# Patient Record
Sex: FEMALE | Race: WHITE | NOT HISPANIC OR LATINO | Employment: FULL TIME | URBAN - METROPOLITAN AREA
[De-identification: names, ages, dates, MRNs, and addresses within clinical notes are randomized per-mention and may not be internally consistent; named-entity substitution may affect disease eponyms.]

---

## 2018-06-19 ENCOUNTER — HISTORICAL (OUTPATIENT)
Dept: ADMINISTRATIVE | Facility: HOSPITAL | Age: 58
End: 2018-06-19

## 2018-06-19 LAB
ABS NEUT (OLG): 4
ALBUMIN SERPL-MCNC: 4.2 GM/DL (ref 3.4–5)
ALBUMIN/GLOB SERPL: 1.75 {RATIO} (ref 1.5–2.5)
ALP SERPL-CCNC: 72 UNIT/L (ref 38–126)
ALT SERPL-CCNC: 6 UNIT/L (ref 7–52)
AST SERPL-CCNC: 14 UNIT/L (ref 15–37)
BILIRUB SERPL-MCNC: 0.3 MG/DL (ref 0.2–1)
BILIRUBIN DIRECT+TOT PNL SERPL-MCNC: 0.1 MG/DL (ref 0–0.5)
BILIRUBIN DIRECT+TOT PNL SERPL-MCNC: 0.2 MG/DL
BUN SERPL-MCNC: 12 MG/DL (ref 7–18)
CALCIUM SERPL-MCNC: 8.9 MG/DL (ref 8.5–10)
CHLORIDE SERPL-SCNC: 102 MMOL/L (ref 98–107)
CHOLEST SERPL-MCNC: 173 MG/DL (ref 0–200)
CHOLEST/HDLC SERPL: 3.7 {RATIO}
CO2 SERPL-SCNC: 30 MMOL/L (ref 21–32)
CREAT SERPL-MCNC: 0.81 MG/DL (ref 0.6–1.3)
ERYTHROCYTE [DISTWIDTH] IN BLOOD BY AUTOMATED COUNT: 14.2 % (ref 11.5–17)
GLOBULIN SER-MCNC: 2.4 GM/DL (ref 1.2–3)
GLUCOSE SERPL-MCNC: 93 MG/DL (ref 74–106)
HCT VFR BLD AUTO: 44.1 % (ref 37–47)
HDLC SERPL-MCNC: 47 MG/DL (ref 35–60)
HGB BLD-MCNC: 15.2 GM/DL (ref 12–16)
LDLC SERPL CALC-MCNC: 112 MG/DL (ref 0–129)
LYMPHOCYTES # BLD AUTO: 3.8 X10(3)/MCL (ref 0.6–3.4)
LYMPHOCYTES NFR BLD AUTO: 44.3 % (ref 13–40)
MCH RBC QN AUTO: 32.4 PG (ref 27–31.2)
MCHC RBC AUTO-ENTMCNC: 34 GM/DL (ref 32–36)
MCV RBC AUTO: 94 FL (ref 80–94)
MONOCYTES # BLD AUTO: 0.7 X10(3)/MCL (ref 0–1.8)
MONOCYTES NFR BLD AUTO: 8.4 % (ref 0.1–24)
NEUTROPHILS NFR BLD AUTO: 47.3 % (ref 47–80)
PLATELET # BLD AUTO: 206 X10(3)/MCL (ref 130–400)
PMV BLD AUTO: 9.2 FL
POTASSIUM SERPL-SCNC: 3.7 MMOL/L (ref 3.5–5.1)
PROT SERPL-MCNC: 6.6 GM/DL (ref 6.4–8.2)
RBC # BLD AUTO: 4.69 X10(6)/MCL (ref 4.2–5.4)
SODIUM SERPL-SCNC: 137 MMOL/L (ref 136–145)
TRIGL SERPL-MCNC: 51 MG/DL (ref 30–150)
VLDLC SERPL CALC-MCNC: 10.2 MG/DL
WBC # SPEC AUTO: 8.5 X10(3)/MCL (ref 4.5–11.5)

## 2018-08-21 ENCOUNTER — HISTORICAL (OUTPATIENT)
Dept: ADMINISTRATIVE | Facility: HOSPITAL | Age: 58
End: 2018-08-21

## 2018-08-21 LAB
HEMOCCULT SP1 STL QL: NEGATIVE
HEMOCCULT SP2 STL QL: NEGATIVE

## 2019-06-24 ENCOUNTER — HISTORICAL (OUTPATIENT)
Dept: ADMINISTRATIVE | Facility: HOSPITAL | Age: 59
End: 2019-06-24

## 2019-06-24 LAB
ABS NEUT (OLG): 5.6 X10(3)/MCL (ref 2.1–9.2)
ALBUMIN SERPL-MCNC: 4.4 GM/DL (ref 3.4–5)
ALBUMIN/GLOB SERPL: 2.32 {RATIO} (ref 1.5–2.5)
ALP SERPL-CCNC: 66 UNIT/L (ref 38–126)
ALT SERPL-CCNC: 6 UNIT/L (ref 7–52)
AST SERPL-CCNC: 14 UNIT/L (ref 15–37)
BILIRUB SERPL-MCNC: 0.4 MG/DL (ref 0.2–1)
BILIRUBIN DIRECT+TOT PNL SERPL-MCNC: 0.1 MG/DL (ref 0–0.5)
BILIRUBIN DIRECT+TOT PNL SERPL-MCNC: 0.3 MG/DL
BUN SERPL-MCNC: 11 MG/DL (ref 7–18)
CALCIUM SERPL-MCNC: 9 MG/DL (ref 8.5–10)
CHLORIDE SERPL-SCNC: 104 MMOL/L (ref 98–107)
CHOLEST SERPL-MCNC: 180 MG/DL (ref 0–200)
CHOLEST/HDLC SERPL: 4 {RATIO}
CO2 SERPL-SCNC: 29 MMOL/L (ref 21–32)
CREAT SERPL-MCNC: 0.65 MG/DL (ref 0.6–1.3)
ERYTHROCYTE [DISTWIDTH] IN BLOOD BY AUTOMATED COUNT: 14.6 % (ref 11.5–17)
GLOBULIN SER-MCNC: 1.9 GM/DL (ref 1.2–3)
GLUCOSE SERPL-MCNC: 91 MG/DL (ref 74–106)
HCT VFR BLD AUTO: 43.8 % (ref 37–47)
HDLC SERPL-MCNC: 45 MG/DL (ref 35–60)
HGB BLD-MCNC: 14.8 GM/DL (ref 12–16)
LDLC SERPL CALC-MCNC: 111 MG/DL (ref 0–129)
LYMPHOCYTES # BLD AUTO: 4.6 X10(3)/MCL (ref 0.6–3.4)
LYMPHOCYTES NFR BLD AUTO: 42.2 % (ref 13–40)
MCH RBC QN AUTO: 31.4 PG (ref 27–31.2)
MCHC RBC AUTO-ENTMCNC: 34 GM/DL (ref 32–36)
MCV RBC AUTO: 93 FL (ref 80–94)
MONOCYTES # BLD AUTO: 0.7 X10(3)/MCL (ref 0.1–1.3)
MONOCYTES NFR BLD AUTO: 6.2 % (ref 0.1–24)
NEUTROPHILS NFR BLD AUTO: 51.6 % (ref 47–80)
PLATELET # BLD AUTO: 225 X10(3)/MCL (ref 130–400)
PMV BLD AUTO: 9.4 FL (ref 9.4–12.4)
POTASSIUM SERPL-SCNC: 4.6 MMOL/L (ref 3.5–5.1)
PROT SERPL-MCNC: 6.3 GM/DL (ref 6.4–8.2)
RBC # BLD AUTO: 4.72 X10(6)/MCL (ref 4.2–5.4)
SODIUM SERPL-SCNC: 138 MMOL/L (ref 136–145)
TRIGL SERPL-MCNC: 55 MG/DL (ref 30–150)
TSH SERPL-ACNC: 1.47 MIU/ML (ref 0.35–4.94)
VLDLC SERPL CALC-MCNC: 11 MG/DL
WBC # SPEC AUTO: 10.9 X10(3)/MCL (ref 4.5–11.5)

## 2019-09-30 ENCOUNTER — HISTORICAL (OUTPATIENT)
Dept: ADMINISTRATIVE | Facility: HOSPITAL | Age: 59
End: 2019-09-30

## 2020-06-29 ENCOUNTER — HISTORICAL (OUTPATIENT)
Dept: ADMINISTRATIVE | Facility: HOSPITAL | Age: 60
End: 2020-06-29

## 2020-06-29 LAB
ABS NEUT (OLG): 3.3 X10(3)/MCL (ref 2.1–9.2)
ALBUMIN SERPL-MCNC: 4.3 GM/DL (ref 3.4–5)
ALBUMIN/GLOB SERPL: 1.95 {RATIO} (ref 1.5–2.5)
ALP SERPL-CCNC: 55 UNIT/L (ref 38–126)
ALT SERPL-CCNC: 7 UNIT/L (ref 7–52)
AST SERPL-CCNC: 16 UNIT/L (ref 15–37)
BILIRUB SERPL-MCNC: 0.4 MG/DL (ref 0.2–1)
BILIRUBIN DIRECT+TOT PNL SERPL-MCNC: 0.1 MG/DL (ref 0–0.5)
BILIRUBIN DIRECT+TOT PNL SERPL-MCNC: 0.3 MG/DL
BUN SERPL-MCNC: 13 MG/DL (ref 7–18)
CALCIUM SERPL-MCNC: 9.5 MG/DL (ref 8.5–10)
CHLORIDE SERPL-SCNC: 102 MMOL/L (ref 98–107)
CHOLEST SERPL-MCNC: 219 MG/DL (ref 0–200)
CHOLEST/HDLC SERPL: 3.9 {RATIO}
CO2 SERPL-SCNC: 27 MMOL/L (ref 21–32)
CREAT SERPL-MCNC: 0.65 MG/DL (ref 0.6–1.3)
ERYTHROCYTE [DISTWIDTH] IN BLOOD BY AUTOMATED COUNT: 13.2 % (ref 11.5–17)
GLOBULIN SER-MCNC: 2.2 GM/DL (ref 1.2–3)
GLUCOSE SERPL-MCNC: 97 MG/DL (ref 74–106)
HCT VFR BLD AUTO: 42.4 % (ref 37–47)
HDLC SERPL-MCNC: 56 MG/DL (ref 35–60)
HGB BLD-MCNC: 13.9 GM/DL (ref 12–16)
LDLC SERPL CALC-MCNC: 131 MG/DL (ref 0–129)
LYMPHOCYTES # BLD AUTO: 2.7 X10(3)/MCL (ref 0.6–3.4)
LYMPHOCYTES NFR BLD AUTO: 41.2 % (ref 13–40)
MCH RBC QN AUTO: 29.8 PG (ref 27–31.2)
MCHC RBC AUTO-ENTMCNC: 33 GM/DL (ref 32–36)
MCV RBC AUTO: 91 FL (ref 80–94)
MONOCYTES # BLD AUTO: 0.5 X10(3)/MCL (ref 0.1–1.3)
MONOCYTES NFR BLD AUTO: 8.3 % (ref 0.1–24)
NEUTROPHILS NFR BLD AUTO: 50.5 % (ref 47–80)
PLATELET # BLD AUTO: 240 X10(3)/MCL (ref 130–400)
PMV BLD AUTO: 9 FL (ref 9.4–12.4)
POTASSIUM SERPL-SCNC: 4.4 MMOL/L (ref 3.5–5.1)
PROT SERPL-MCNC: 6.5 GM/DL (ref 6.4–8.2)
RBC # BLD AUTO: 4.66 X10(6)/MCL (ref 4.2–5.4)
SODIUM SERPL-SCNC: 138 MMOL/L (ref 136–145)
TRIGL SERPL-MCNC: 71 MG/DL (ref 30–150)
TSH SERPL-ACNC: 4.94 MIU/ML (ref 0.35–4.94)
VLDLC SERPL CALC-MCNC: 14.2 MG/DL
WBC # SPEC AUTO: 6.5 X10(3)/MCL (ref 4.5–11.5)

## 2021-01-07 ENCOUNTER — HISTORICAL (OUTPATIENT)
Dept: ADMINISTRATIVE | Facility: HOSPITAL | Age: 61
End: 2021-01-07

## 2021-01-07 LAB
ALBUMIN SERPL-MCNC: 4.3 GM/DL (ref 3.4–5)
ALBUMIN/GLOB SERPL: 1.95 {RATIO} (ref 1.5–2.5)
ALP SERPL-CCNC: 46 UNIT/L (ref 38–126)
ALT SERPL-CCNC: 13 UNIT/L (ref 7–52)
AST SERPL-CCNC: 21 UNIT/L (ref 15–37)
BILIRUB SERPL-MCNC: 0.4 MG/DL (ref 0.2–1)
BILIRUBIN DIRECT+TOT PNL SERPL-MCNC: 0.1 MG/DL (ref 0–0.5)
BILIRUBIN DIRECT+TOT PNL SERPL-MCNC: 0.3 MG/DL
BUN SERPL-MCNC: 9 MG/DL (ref 7–18)
CALCIUM SERPL-MCNC: 9.5 MG/DL (ref 8.5–10)
CHLORIDE SERPL-SCNC: 102 MMOL/L (ref 98–107)
CHOLEST SERPL-MCNC: 125 MG/DL (ref 0–200)
CHOLEST/HDLC SERPL: 2.3 {RATIO}
CO2 SERPL-SCNC: 30 MMOL/L (ref 21–32)
CREAT SERPL-MCNC: 0.62 MG/DL (ref 0.6–1.3)
GLOBULIN SER-MCNC: 2.2 GM/DL (ref 1.2–3)
GLUCOSE SERPL-MCNC: 96 MG/DL (ref 74–106)
HDLC SERPL-MCNC: 55 MG/DL (ref 35–60)
LDLC SERPL CALC-MCNC: 65 MG/DL (ref 0–129)
POTASSIUM SERPL-SCNC: 4.8 MMOL/L (ref 3.5–5.1)
PROT SERPL-MCNC: 6.5 GM/DL (ref 6.4–8.2)
SODIUM SERPL-SCNC: 139 MMOL/L (ref 136–145)
TRIGL SERPL-MCNC: 49 MG/DL (ref 30–150)
VLDLC SERPL CALC-MCNC: 9.8 MG/DL

## 2021-01-20 ENCOUNTER — HISTORICAL (OUTPATIENT)
Dept: ADMINISTRATIVE | Facility: HOSPITAL | Age: 61
End: 2021-01-20

## 2021-01-20 LAB
ABS NEUT (OLG): 3.6 X10(3)/MCL (ref 2.1–9.2)
ERYTHROCYTE [DISTWIDTH] IN BLOOD BY AUTOMATED COUNT: 13.8 % (ref 11.5–17)
ERYTHROCYTE [SEDIMENTATION RATE] IN BLOOD: 4 MM/HR (ref 0–20)
HCT VFR BLD AUTO: 42.4 % (ref 37–47)
HGB BLD-MCNC: 13.8 GM/DL (ref 12–16)
LYMPHOCYTES # BLD AUTO: 2.9 X10(3)/MCL (ref 0.6–3.4)
LYMPHOCYTES NFR BLD AUTO: 41.1 % (ref 13–40)
MCH RBC QN AUTO: 29.4 PG (ref 27–31.2)
MCHC RBC AUTO-ENTMCNC: 32 GM/DL (ref 32–36)
MCV RBC AUTO: 90 FL (ref 80–94)
MONOCYTES # BLD AUTO: 0.6 X10(3)/MCL (ref 0.1–1.3)
MONOCYTES NFR BLD AUTO: 8.2 % (ref 0.1–24)
NEUTROPHILS NFR BLD AUTO: 50.7 % (ref 47–80)
PLATELET # BLD AUTO: 248 X10(3)/MCL (ref 130–400)
PMV BLD AUTO: 9.6 FL (ref 9.4–12.4)
RBC # BLD AUTO: 4.69 X10(6)/MCL (ref 4.2–5.4)
WBC # SPEC AUTO: 7.1 X10(3)/MCL (ref 4.5–11.5)

## 2021-02-01 ENCOUNTER — HISTORICAL (OUTPATIENT)
Dept: ADMINISTRATIVE | Facility: HOSPITAL | Age: 61
End: 2021-02-01

## 2021-02-01 LAB — C DIFF INTERP: NEGATIVE

## 2021-02-04 LAB — FINAL CULTURE: NORMAL

## 2021-07-26 ENCOUNTER — HISTORICAL (OUTPATIENT)
Dept: ADMINISTRATIVE | Facility: HOSPITAL | Age: 61
End: 2021-07-26

## 2021-07-26 LAB
ALBUMIN SERPL-MCNC: 4.6 GM/DL (ref 3.4–5)
ALBUMIN/GLOB SERPL: 2 {RATIO} (ref 1.5–2.5)
ALP SERPL-CCNC: 60 UNIT/L (ref 38–126)
ALT SERPL-CCNC: 10 UNIT/L (ref 7–52)
AST SERPL-CCNC: 18 UNIT/L (ref 15–37)
BILIRUB SERPL-MCNC: 0.6 MG/DL (ref 0.2–1)
BILIRUBIN DIRECT+TOT PNL SERPL-MCNC: 0.1 MG/DL (ref 0–0.5)
BILIRUBIN DIRECT+TOT PNL SERPL-MCNC: 0.5 MG/DL
BUN SERPL-MCNC: 12 MG/DL (ref 7–18)
CALCIUM SERPL-MCNC: 9.6 MG/DL (ref 8.5–10)
CHLORIDE SERPL-SCNC: 102 MMOL/L (ref 98–107)
CHOLEST SERPL-MCNC: 117 MG/DL (ref 0–200)
CHOLEST/HDLC SERPL: 2.3 {RATIO}
CO2 SERPL-SCNC: 30 MMOL/L (ref 21–32)
CREAT SERPL-MCNC: 0.63 MG/DL (ref 0.6–1.3)
GLOBULIN SER-MCNC: 2.4 GM/DL (ref 1.2–3)
GLUCOSE SERPL-MCNC: 98 MG/DL (ref 74–106)
HDLC SERPL-MCNC: 51 MG/DL (ref 35–60)
LDLC SERPL CALC-MCNC: 53 MG/DL (ref 0–129)
POTASSIUM SERPL-SCNC: 4.6 MMOL/L (ref 3.5–5.1)
PROT SERPL-MCNC: 6.9 GM/DL (ref 6.4–8.2)
SODIUM SERPL-SCNC: 140 MMOL/L (ref 136–145)
TRIGL SERPL-MCNC: 67 MG/DL (ref 30–150)
VLDLC SERPL CALC-MCNC: 13.4 MG/DL

## 2022-01-26 ENCOUNTER — HISTORICAL (OUTPATIENT)
Dept: ADMINISTRATIVE | Facility: HOSPITAL | Age: 62
End: 2022-01-26

## 2022-01-26 LAB
ALBUMIN SERPL-MCNC: 4.3 GM/DL (ref 3.4–5)
ALBUMIN/GLOB SERPL: 2.05 {RATIO} (ref 1.5–2.5)
ALP SERPL-CCNC: 53 UNIT/L (ref 38–126)
ALT SERPL-CCNC: 10 UNIT/L (ref 7–52)
AST SERPL-CCNC: 19 UNIT/L (ref 15–37)
BILIRUB SERPL-MCNC: 0.5 MG/DL (ref 0.2–1)
BILIRUBIN DIRECT+TOT PNL SERPL-MCNC: 0.2 MG/DL (ref 0–0.5)
BILIRUBIN DIRECT+TOT PNL SERPL-MCNC: 0.3 MG/DL
BUN SERPL-MCNC: 8 MG/DL (ref 7–18)
CALCIUM SERPL-MCNC: 9.5 MG/DL (ref 8.5–10)
CHLORIDE SERPL-SCNC: 104 MMOL/L (ref 98–107)
CHOLEST SERPL-MCNC: 117 MG/DL (ref 0–200)
CHOLEST/HDLC SERPL: 2.1 {RATIO}
CO2 SERPL-SCNC: 31 MMOL/L (ref 21–32)
CREAT SERPL-MCNC: 0.62 MG/DL (ref 0.6–1.3)
GLOBULIN SER-MCNC: 2.1 GM/DL (ref 1.2–3)
GLUCOSE SERPL-MCNC: 102 MG/DL (ref 74–106)
HDLC SERPL-MCNC: 55 MG/DL (ref 35–60)
LDLC SERPL CALC-MCNC: 45 MG/DL (ref 0–129)
POTASSIUM SERPL-SCNC: 4.9 MMOL/L (ref 3.5–5.1)
PROT SERPL-MCNC: 6.4 GM/DL (ref 6.4–8.2)
SODIUM SERPL-SCNC: 142 MMOL/L (ref 136–145)
TRIGL SERPL-MCNC: 70 MG/DL (ref 30–150)
VLDLC SERPL CALC-MCNC: 14 MG/DL

## 2022-04-11 ENCOUNTER — HISTORICAL (OUTPATIENT)
Dept: ADMINISTRATIVE | Facility: HOSPITAL | Age: 62
End: 2022-04-11

## 2022-04-25 VITALS
SYSTOLIC BLOOD PRESSURE: 120 MMHG | BODY MASS INDEX: 21.86 KG/M2 | WEIGHT: 118.81 LBS | OXYGEN SATURATION: 98 % | DIASTOLIC BLOOD PRESSURE: 78 MMHG | HEIGHT: 62 IN

## 2022-05-05 NOTE — HISTORICAL [facility]ER
This is a historical note converted from iVlma. Formatting and pictures may have been removed.  Please reference Vilma for original formatting and attached multimedia. Chief Complaint  discuss cacs  History of Present Illness  Patient here to discuss her coronary calcium score, had a?score of 137? in 9/2019  quit smoking july 16th?and doing okay;  No cough/hemoptysis/etc.  No angina symptoms, no exertional symptoms at all, etc.  having some postnasal drip/throat clearing?but no hemoptysis  had?cholesterol of ?180 in june, LDL was 111  having back and neck pain?which?are not really new problems for her, she also states that she has a history of scoliosis  states has a herniated disc in spine but not sure where  no cardiac/respiratory?complaints  Review of Systems  ?14 point Review of Systems performed with no exceptions for new complaints other than as noted in HPI.  Physical Exam  Constitutional_WDWN, NAD, alert and oriented  Lungs CTA  Heart RRR  Assessment/Plan  1.?Agatston CAC score 100-199?R93.1  ?The patient and I had a very long discussion about coronary calcium scores, and the ramifications of?the findings.? The patient is currently asymptomatic. ?Discussed?the necessity for?baby aspirin and statin?for life if possible.? Discussed risks, benefits, alternatives, and side effects to these medications.? Discussed the meaning of?the patients score?and that ?even patients with a?mildly elevated?score can have?a heart attack.? Discussed angina precautions.? Also discussed the nature of hard plaque versus soft plaque?and?the difference between?progressive narrowing of coronary artery?disease causing angina?versus the?sudden rupture of plaque causing a heart attack.? Patient verbalized understanding.?All questions answered.?Will follow up?fasting lipid panel and CMP.  Ordered:  Clinic Follow up, *Est. 01/06/20 11:15:00 CST, Order for future visit, Osteoarthritis of spine, [facility]  Office/Outpatient Visit Level 4  Established [id number] PC, Agatston CAC score 100-199  Throat clearing  Scoliosis  Back pain  Neck pain  Osteoarthritis of spine, [facility], 09/30/19 8:51:00 CDT  ?  2.?Throat clearing?R68.89  ?try humidifier/flonase/shell call if symptoms persist  Ordered:  Clinic Follow up, *Est. 01/06/20 11:15:00 CST, Order for future visit, Osteoarthritis of spine, [facility]  Office/Outpatient Visit Level 4 Established [id number] PC, Agatston CAC score 100-199  Throat clearing  Scoliosis  Back pain  Neck pain  Osteoarthritis of spine, [facility], 09/30/19 8:51:00 CDT  ?  3.?Scoliosis?M41.9  chronic, apparently stable  Ordered:  Clinic Follow up, *Est. 01/06/20 11:15:00 CST, Order for future visit, Osteoarthritis of spine, [facility]  Office/Outpatient Visit Level 4 Established [id number] PC, Agatston CAC score 100-199  Throat clearing  Scoliosis  Back pain  Neck pain  Osteoarthritis of spine, [facility], 09/30/19 8:51:00 CDT  XR Spine Thoracic 2 Views, Routine, 09/30/19 8:26:00 CDT, Pain, back, None, Ambulatory, Rad Type, Back pain  Scoliosis, [facility], 09/30/19 8:26:00 CDT  ?  4.?Back pain?M54.9  rec ?PT  Ordered:  Clinic Follow up, *Est. 01/06/20 11:15:00 CST, Order for future visit, Osteoarthritis of spine, [facility]  Office/Outpatient Visit Level 4 Established [id number] PC, Agatston CAC score 100-199  Throat clearing  Scoliosis  Back pain  Neck pain  Osteoarthritis of spine, [facility], 09/30/19 8:51:00 CDT  XR Spine Thoracic 2 Views, Routine, 09/30/19 8:26:00 CDT, Pain, back, None, Ambulatory, Rad Type, Back pain  Scoliosis, [facility], 09/30/19 8:26:00 CDT  ?  5.?Neck pain?M54.2  ?rec PT  Ordered:  Clinic Follow up, *Est. 01/06/20 11:15:00 CST, Order for future visit, Osteoarthritis of spine, [facility]  Office/Outpatient Visit Level 4 Established [id number] PC, Agatston CAC score 100-199  Throat clearing  Scoliosis  Back pain  Neck pain  Osteoarthritis of spine,  [facility], 09/30/19 8:51:00 CDT  XR Spine Cervical 2 or 3 Views, Routine, 09/30/19 8:26:00 CDT, Other (please specify), None, Ambulatory, Rad Type, Neck pain, [facility], 09/30/19 8:26:00 CDT  ?  6.?Osteoarthritis of spine?M47.9  ?chronic  Ordered:  Clinic Follow up, *Est. 01/06/20 11:15:00 CST, Order for future visit, Osteoarthritis of spine, [facility]  Office/Outpatient Visit Level 4 Established [id number] PC, Agatston CAC score 100-199  Throat clearing  Scoliosis  Back pain  Neck pain  Osteoarthritis of spine, [facility], 09/30/19 8:51:00 CDT  ?  7.?Tobacco user?Z72.0  ?Encouragement given, patient is?doing well with her smoking cessation at this time  ?  Orders:  rosuvastatin, 10 mg = 1 tab(s), Oral, Daily, # 30 tab(s), 3 Refill(s), Pharmacy: [id number]  Total office visit time of over 35 minutes  Referrals  Clinic Follow up, *Est. 01/06/20 11:15:00 CST, Order for future visit, Osteoarthritis of spine, [facility]   Problem List/Past Medical History  Ongoing  Agatston CAC score 100-199  Anxiety  Back pain  Depression  H/O tuberculosis  History of herniated intervertebral disc  Neck pain  Osteoarthritis of spine  Scoliosis  Skin lesion  Tobacco user  Wellness examination  Historical  No qualifying data  Procedure/Surgical History  breast augmentation  wisdom teeth   Medications  Chantix Continuing Month 1 mg oral tablet, 1 mg= 1 tab(s), Oral, BID, 5 refills  Chantix Starter Pack 0.5 mg-1 mg oral tablet, 1 tab(s), Oral, BID  Crestor 10 mg oral tablet, 10 mg= 1 tab(s), Oral, Daily, 3 refills  Xanax 1 mg oral tablet, 1 mg= 1 tab(s), Oral, BID, 1 refills,? ?Unable to obtain: pt has not started this medication as of 7/30/19- she is on chantix  Allergies  No Known Medication Allergies  Social History  Abuse/Neglect  No, 07/30/2019  No, 06/24/2019  Alcohol  Past, 06/19/2018  Employment/School  Employed, 06/19/2018  Substance Use  Never, 06/19/2018  Tobacco  4 or less cigarettes(less  than 1/4 pack)/day in last 30 days, No, 07/30/2019  10 or more cigarettes (1/2 pack or more)/day in last 30 days, No, 06/24/2019  Family History  Alcoholism.: Father.  COPD (chronic obstructive pulmonary disease).: Mother and Father.  CVA - Cerebrovascular accident: Mother.  DVT - Deep vein thrombosis: Father.  Hypothyroidism.: Sister.  Mitral valve prolapse: Mother.  Renal cancer: Sister.  Immunizations  Vaccine Date Status Comments   influenza virus vaccine, inactivated 12/30/2017 Recorded    influenza virus vaccine, inactivated 11/20/2016 Recorded    influenza virus vaccine, inactivated 11/28/2015 Recorded    pneumococcal 23-polyvalent vaccine 05/11/2015 Recorded    influenza virus vaccine, inactivated 11/24/2014 Recorded    tetanus/diphth/pertuss (Tdap) adult/adol 05/08/2014 Recorded    pneumococcal 13-valent conjugate vaccine 05/08/2014 Recorded    influenza virus vaccine, inactivated 11/16/2013 Recorded    influenza virus vaccine, inactivated 10/15/2011 Recorded 2018-06-19: UNKNOWN CAMPAIGNID   Health Maintenance  Health Maintenance  ???Pending?(in the next year)  ??? ??OverDue  ??? ? ? ?Diabetes Screening due??and every?  ??? ? ? ?Cervical Cancer Screening due??05/07/17??and every 3??year(s)  ??? ? ? ?Alcohol Misuse Screening due??01/01/19??and every 1??year(s)  ??? ? ? ?Smoking Cessation due??01/01/19??Variable frequency  ??? ??Due?  ??? ? ? ?Colorectal Screening due??08/21/19??and every 1??year(s)  ??? ? ? ?ADL Screening due??09/30/19??and every 1??year(s)  ??? ? ? ?Aspirin Therapy for CVD Prevention due??09/30/19??and every 1??year(s)  ??? ? ? ?Depression Screening due??09/30/19??and every?  ??? ? ? ?Influenza Vaccine due??09/30/19??and every?  ??? ??Due In Future?  ??? ? ? ?Obesity Screening not due until??01/01/20??and every 1??year(s)  ??? ? ? ?Blood Pressure Screening not due until??07/29/20??and every 1??year(s)  ??? ? ? ?Body Mass Index Check not due until??07/29/20??and every 1??year(s)  ??? ? ?  ?Lung Cancer Screening not due until??08/02/20??and every 1??year(s)  ???Satisfied?(in the past 1 year)  ??? ??Satisfied?  ??? ? ? ?Blood Pressure Screening on??07/30/19.??Satisfied by Cherri Livingston CMA  ??? ? ? ?Body Mass Index Check on??07/30/19.??Satisfied by Cherri Livingston CMA  ??? ? ? ?Breast Cancer Screening on??08/20/19.??Satisfied by Summer Herrera  ??? ? ? ?Diabetes Screening on??06/24/19.??Satisfied by Mariana Hyatt  ??? ? ? ?Lipid Screening on??06/24/19.??Satisfied by Mariana Hyatt  ??? ? ? ?Lung Cancer Screening on??08/02/19.  ??? ? ? ?Obesity Screening on??07/30/19.??Satisfied by Cherri Livingston CMA  ?

## 2022-08-30 PROBLEM — R93.1 AGATSTON CAC SCORE, <100: Status: RESOLVED | Noted: 2022-08-30 | Resolved: 2022-08-30

## 2022-08-30 PROBLEM — F41.9 ANXIETY: Status: ACTIVE | Noted: 2022-08-30

## 2022-08-30 PROBLEM — M41.9 SCOLIOSIS: Status: ACTIVE | Noted: 2022-08-30

## 2022-08-30 PROBLEM — R93.1 AGATSTON CAC SCORE, <100: Status: ACTIVE | Noted: 2022-08-30

## 2022-08-30 PROBLEM — E78.5 HYPERLIPIDEMIA: Status: ACTIVE | Noted: 2022-08-30

## 2022-08-30 PROBLEM — M47.9 SPONDYLOSIS: Status: ACTIVE | Noted: 2022-08-30

## 2022-08-30 PROBLEM — F32.A DEPRESSIVE DISORDER: Status: ACTIVE | Noted: 2022-08-30
